# Patient Record
Sex: MALE | Race: WHITE | Employment: FULL TIME | URBAN - METROPOLITAN AREA
[De-identification: names, ages, dates, MRNs, and addresses within clinical notes are randomized per-mention and may not be internally consistent; named-entity substitution may affect disease eponyms.]

---

## 2017-09-12 ENCOUNTER — OFFICE VISIT (OUTPATIENT)
Dept: FAMILY MEDICINE | Facility: CLINIC | Age: 52
End: 2017-09-12
Payer: COMMERCIAL

## 2017-09-12 VITALS
BODY MASS INDEX: 18.2 KG/M2 | HEART RATE: 81 BPM | TEMPERATURE: 97.5 F | WEIGHT: 130 LBS | DIASTOLIC BLOOD PRESSURE: 97 MMHG | SYSTOLIC BLOOD PRESSURE: 147 MMHG | HEIGHT: 71 IN | OXYGEN SATURATION: 98 %

## 2017-09-12 DIAGNOSIS — I10 HYPERTENSION GOAL BP (BLOOD PRESSURE) < 140/90: ICD-10-CM

## 2017-09-12 DIAGNOSIS — J40 BRONCHITIS: Primary | ICD-10-CM

## 2017-09-12 DIAGNOSIS — F17.200 SMOKER: ICD-10-CM

## 2017-09-12 PROCEDURE — 99214 OFFICE O/P EST MOD 30 MIN: CPT | Performed by: FAMILY MEDICINE

## 2017-09-12 RX ORDER — AZITHROMYCIN 250 MG/1
TABLET, FILM COATED ORAL
Qty: 6 TABLET | Refills: 0 | Status: SHIPPED | OUTPATIENT
Start: 2017-09-12 | End: 2019-11-26

## 2017-09-12 RX ORDER — PREDNISONE 20 MG/1
40 TABLET ORAL DAILY
Qty: 10 TABLET | Refills: 0 | Status: SHIPPED | OUTPATIENT
Start: 2017-09-12 | End: 2017-09-17

## 2017-09-12 NOTE — MR AVS SNAPSHOT
After Visit Summary   9/12/2017    Paddy Colmenares    MRN: [id number]           Patient Information     Date Of Birth          1965        Visit Information        Provider Department      9/12/2017 9:00 AM Hakeem Pena MD [facility]        Today's Diagnoses     Bronchitis    -  1    Hypertension goal BP (blood pressure) < 140/90        Smoker          Care Instructions      Discharge Instructions for High Blood Pressure (Hypertension)  You have been diagnosed with high blood pressure (also called hypertension). This means the force of blood against your artery walls is too strong. It also means your heart is working hard to move blood. High blood pressure usually has no symptoms, but over time, it can damage your heart, blood vessels, eyes, kidneys, and other organs. With help from your doctor, you can manage your blood pressure and protect your health.  Taking medicine    Learn to take your own blood pressure. Keep a record of your results. Ask your doctor which readings mean that you need medical attention.    Take your blood pressure medicine exactly as directed. Don t skip doses. Missing doses can cause your blood pressure to get out of control.    If you do miss a dose (or doses) check with your healthcare provider about what to do.    Avoid medicine that contain heart stimulants, including over-the-counter drugs. Check for warnings about high blood pressure on the label. Ask the pharmacist before purchasing something you haven't used before    Check with your doctor or pharmacist before taking a decongestant. Some decongestants can worsen high blood pressure.  Lifestyle changes    Maintain a healthy weight. Get help to lose any extra pounds.    Cut back on salt.    Limit canned, dried, packaged, and fast foods.    Don t add salt to your food at the table.    Season foods with herbs instead of salt when you cook.    Request no added salt when you go to a  "restaurant.    The American Heart Association s (AHA) \"ideal\" sodium intake recommendation is 1,500 milligrams per day.  However, since American's eat so much salt, the AHA says a positive change can occur by cutting back to even 2,400 milligrams of sodium a day.     Follow the DASH (Dietary Approaches to Stop Hypertension) eating plan. This plan recommends vegetables, fruits, whole gains, and other heart healthy foods.    Begin an exercise program. Ask your doctor how to get started. The American Heart Association recommends aerobic exercise 3 to 4 times a week for an average of 40 minutes at a time, with your doctor's approval. Simple activities like walking or gardening can help.    Break the smoking habit. Enroll in a stop-smoking program to improve your chances of success. Ask your healthcare provider about programs and medicines to help you stop smoking.    Limit drinks that contain caffeine (coffee, black or green tea, cola) to 2 per day.    Never take stimulants such as amphetamines or cocaine; these drugs can be deadly for someone with high blood pressure.    Control your stress. Learn stress-management techniques.    Limit alcohol to no more than 1 drink a day for women and 2 drinks a day for men.  Follow-up care  Make a follow-up appointment as directed by our staff.     When to seek medical care  Call your doctor immediately or seek emergency care if you have any of the following:    Chest pain or shortness of breath (call 911)    Moderate to severe headache    Weakness in the muscles of your face, arms, or legs    Trouble speaking    Extreme drowsiness    Confusion    Fainting or dizziness    Pulsating or rushing sound in your ears    Unexplained nosebleed    Weakness, tingling, or numbness of your face, arms, or legs    Change in vision    Blood pressure measured at home that is greater than 180/110   Date Last Reviewed: 4/27/2016 2000-2017 [employer]. [address] [place] [address], [place], " [place] [address]. All rights reserved. This information is not intended as a substitute for professional medical care. Always follow your healthcare professional's instructions.        Your High Blood Pressure Risk Factors  Risk factors are things that make you more likely to have a disease or condition. Do you know your risk factors for high blood pressure? You can t do anything about some risk factors. But other risk factors are things that can be changed. Know what high blood pressure risk factors you have. Then find out what changes you can make to help control your risk for high blood pressure. Start with the change that you think will be easiest for you.  Risk factors you can t control  Though you can t change any of the things listed below, check off the ones that apply to you. The more boxes you check, the greater your risk for high blood pressure.  Family history  ? One or both of your parents or grandparents has had high blood pressure or heart disease.  Gender and age  ? You re a man over age 55 or a postmenopausal woman.  Risk factors you can control  There are plenty of risk factors for high blood pressure that you can control. Learn what these risk factors are and then find out how to reduce your risk. Check the ones that apply to you.  ? What you eat  Do you eat a lot of salty, fatty, fried, or greasy foods? Do you go to restaurants or eat out frequently?  ? Alcohol consumption  Do you drink more than 1 drink a day if you are a female or more than 2 drinks a day if you male?   ? If you smoke or someone close to you smokes  Do you smoke cigarettes or cigars, chew tobacco, or dip snuff? Are you exposed to second-hand smoke on a regular basis?  ? How active you are   Are you inactive most of the time at work and at home? Do you go weeks without exercising?  ? Your weight   Has your doctor said that you are 15 or more pounds overweight?  ? Your stress level    Do you often feel anxious, nervous, and stressed? Do  you feel that you don't have a supportive environment?  Date Last Reviewed: 4/27/2016 2000-2017 [employer]. [address] [place] [address], [place] [address]. All rights reserved. This information is not intended as a substitute for professional medical care. Always follow your healthcare professional's instructions.        Established High Blood Pressure    High blood pressure (hypertension) is a chronic disease. Often, healthcare providers don t know what causes it. But it can be caused by certain health conditions and medicines.  If you have high blood pressure, you may not have any symptoms. If you do have symptoms, they may include headache, dizziness, changes in your vision, chest pain, and shortness of breath. But even without symptoms, high blood pressure that s not treated raises your risk for heart attack and stroke. High blood pressure is a serious health risk and shouldn t be ignored.  A blood pressure reading is made up of two numbers: a higher number over a lower number. The top number is the systolic pressure. The bottom number is the diastolic pressure. A normal blood pressure is a systolic pressure of  less than 120 over a diastolic pressure of less than 80. You will see your blood pressure readings written together. For example, a person with a systolic pressure of 188 and a diastolic pressure of 78 will have 118/78 written in the medical record.  High blood pressure is when either the top number is 140 or higher, or the bottom number is 90 or higher. This must be the result when taking your blood pressure a number of times. The blood pressures between normal and high are called prehypertension.  Home care  If you have high blood pressure, you should do what is listed below to lower your blood pressure. If you are taking medicines for high blood pressure, these methods may reduce or end your need for medicines in the future.    Begin a weight-loss program if you are overweight.    Cut  back on how much salt you get in your diet. Here s how to do this:    Don t eat foods that have a lot of salt. These include olives, pickles, smoked meats, and salted potato chips.    Don t add salt to your food at the table.    Use only small amounts of salt when cooking.    Start an exercise program. Talk with your healthcare provider about the type of exercise program that would be best for you. It doesn't have to be hard. Even brisk walking for 20 minutes 3 times a week is a good form of exercise.    Don t take medicines that stimulate the heart. This includes many over-the-counter cold and sinus decongestant pills and sprays, as well as diet pills. Check the warnings about hypertension on the label. Before buying any over-the-counter medicines or supplements, always ask the pharmacist about the product's potential interaction with your high blood pressure and your high blood pressure medicines.    Stimulants such as amphetamine or cocaine could be deadly for someone with high blood pressure. Never take these.    Limit how much caffeine you get in your diet. Switch to caffeine-free products.    Stop smoking. If you are a long-time smoker, this can be hard. Talk to your healthcare provider about medicines and nicotine replacement options to help you. Also, enroll in a stop-smoking program to make it more likely that you will quit for good.    Learn how to handle stress. This is an important part of any program to lower blood pressure. Learn about relaxation methods like meditation, yoga, or biofeedback.    If your provider prescribed medicines, take them exactly as directed. Missing doses may cause your blood pressure get out of control.    If you miss a dose or doses, check with your healthcare provider or pharmacist about what to do.    Consider buying an automatic blood pressure machine. Ask your provider for a recommendation. You can get one of these at most pharmacies.     The American Heart Association  recommends the following guidelines for home blood pressure monitoring:    Don't smoke or drink coffee for 30 minutes before taking your blood pressure.    Go to the bathroom before the test.    Relax for 5 minutes before taking the measurement.    Sit with your back supported (don't sit on a couch or soft chair); keep your feet on the floor uncrossed. Place your arm on a solid flat surface (like a table) with the upper part of the arm at heart level. Place the middle of the cuff directly above the eye of the elbow. Check the monitor's instruction manual for an illustration.    Take multiple readings. When you measure, take 2 to 3 readings one minute apart and record all of the results.    Take your blood pressure at the same time every day, or as your healthcare provider recommends.    Record the date, time, and blood pressure reading.    Take the record with you to your next medical appointment. If your blood pressure monitor has a built-in memory, simply take the monitor with you to your next appointment.    Call your provider if you have several high readings. Don't be frightened by a single high blood pressure reading, but if you get several high readings, check in with your healthcare provider.    Note: When blood pressure reaches a systolic (top number) of 180 or higher OR diastolic (bottom number) of 110 or higher, seek emergency medical treatment.  Follow-up care  You will need to see your healthcare provider regularly. This is to check your blood pressure and to make changes to your medicines. Make a follow-up appointment as directed. Bring the record of your home blood pressure readings to the appointment.  When to seek medical advice  Call your healthcare provider right away if any of these occur:    Blood pressure reaches a systolic (upper number) of 180 or higher OR a diastolic (bottom number) of 110 or higher    Chest pain or shortness of breath    Severe headache    Throbbing or rushing sound in the  "ears    Nosebleed    Sudden severe pain in your belly (abdomen)    Extreme drowsiness, confusion, or fainting    Dizziness or spinning sensation (vertigo)    Weakness of an arm or leg or one side of the face    You have problems speaking or seeing   Date Last Reviewed: 12/1/2016 2000-2017 [employer]. [address] [place] [address]. All rights reserved. This information is not intended as a substitute for professional medical care. Always follow your healthcare professional's instructions.                Follow-ups after your visit        Who to contact     If you have questions or need follow up information about today's clinic visit or your schedule please contact [facility] directly at [phone].  Normal or non-critical lab and imaging results will be communicated to you by [employer], letter or phone within 4 business days after the clinic has received the results. If you do not hear from us within 7 days, please contact the clinic through [employer] or phone. If you have a critical or abnormal lab result, we will notify you by phone as soon as possible.  Submit refill requests through [employer] or call your pharmacy and they will forward the refill request to us. Please allow 3 business days for your refill to be completed.          Additional Information About Your Visit        [employer] Information     [employer] lets you send messages to your doctor, view your test results, renew your prescriptions, schedule appointments and more. To sign up, go to [email or web address] . Click on \"Log in\" on the left side of the screen, which will take you to the Welcome page. Then click on \"Sign up Now\" on the right side of the page.     You will be asked to enter the access code listed below, as well as some personal information. Please follow the directions to create your username and password.     Your access code is: [email or web address]  Expires: 12/11/2017 10:04 AM     Your access " "code will  in 90 days. If you need help or a new code, please call your [facility] clinic or [phone].        Care EveryWhere ID     This is your Care EveryWhere ID. This could be used by other organizations to access your [facility] medical records  [id number]        Your Vitals Were     Pulse Temperature Height Pulse Oximetry BMI (Body Mass Index)       81 97.5  F (36.4  C) (Oral) 5' 11.25\" (1.81 m) 98% 18 kg/m2        Blood Pressure from Last 3 Encounters:   17 (!) 147/97   16 (!) 160/100   12/14/10 139/88    Weight from Last 3 Encounters:   17 130 lb (59 kg)   16 139 lb (63 kg)   12/14/10 133 lb (60.3 kg)              Today, you had the following     No orders found for display         Today's Medication Changes          These changes are accurate as of: 17 10:04 AM.  If you have any questions, ask your nurse or doctor.               Start taking these medicines.        Dose/Directions    predniSONE 20 MG tablet   Commonly known as:  DELTASONE   Used for:  Bronchitis   Started by:  Hakeem Pena MD        Dose:  40 mg   Take 2 tablets (40 mg) by mouth daily for 5 days   Quantity:  10 tablet   Refills:  0         Stop taking these medicines if you haven't already. Please contact your care team if you have questions.     doxycycline 100 MG tablet   Commonly known as:  VIBRA-TABS   Stopped by:  Hakeem Pena MD           EXCEDRIN PO   Stopped by:  Hakeem Pena MD           guaiFENesin-codeine 100-10 MG/5ML Soln solution   Commonly known as:  ROBITUSSIN AC   Stopped by:  Hakeem Pena MD                Where to get your medicines      These medications were sent to [facility] [address] [place] [address] [address] [facility]  [address] [facility] [address]     Phone:  [phone]     azithromycin 250 MG tablet    predniSONE 20 MG tablet                Primary Care Provider Office Phone # Fax #    Hakeem Pena MD " [phone]       [address]  [facility] [address]        Equal Access to Services     MAXIME GILLETTE : Hadii aad ku hadcharbeljuan Alonso, wabrandida mariannasamaria, pramodta kacarterda maylin, librado crespo haleyobed pendleton laKarinahector borja. So [facility] [phone].    ATENCIÓN: Si habla español, tiene a miranda disposición servicios gratuitos de asistencia lingüística. Llame al [phone].    We comply with applicable federal civil rights laws and [place] laws. We do not discriminate on the basis of race, color, national origin, age, disability sex, sexual orientation or gender identity.            Thank you!     Thank you for choosing [facility]  for your care. Our goal is always to provide you with excellent care. Hearing back from our patients is one way we can continue to improve our services. Please take a few minutes to complete the written survey that you may receive in the mail after your visit with us. Thank you!             Your Updated Medication List - Protect others around you: Learn how to safely use, store and throw away your medicines at www.disposemymeds.org.          This list is accurate as of: 9/12/17 10:04 AM.  Always use your most recent med list.                   Brand Name Dispense Instructions for use Diagnosis    azithromycin 250 MG tablet    ZITHROMAX    6 tablet    Two tablets first day, then one tablet daily for four days.    Bronchitis       predniSONE 20 MG tablet    DELTASONE    10 tablet    Take 2 tablets (40 mg) by mouth daily for 5 days    Bronchitis

## 2017-09-12 NOTE — PROGRESS NOTES
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 No pertinent past medical history.    History reviewed. No pertinent surgical history.    History reviewed. No pertinent family history.    Social History   Substance Use Topics     Smoking status: Current Every Day Smoker     Packs/day: 0.50     Types: Cigarettes     Smokeless tobacco: Never Used     Alcohol use No         Problem list and histories reviewed & adjusted, as indicated.    Patient has cut down on his smoking in the last few days     He is having yellow mucus     O; BP (!) 147/97  Pulse 81  Temp 97.5  F (36.4  C) (Oral)  Ht 5' 11.25\" (1.81 m)  Wt 130 lb (59 kg)  SpO2 98%  BMI 18 kg/m2    Head: Normocephalic, atraumatic.  Eyes: Conjunctiva clear, non icteric. PERRLA.  Ears: External ears and TMs normal BL.  Nose: Septum midline, nasal mucosa pink and moist. No discharge.  Mouth / Throat: Normal dentition.  No oral lesions. Pharynx non erythematous, tonsils without hypertrophy.  Neck: Supple, no enlarged LN, trachea midline.    Mild sinus tenderness     Chest wall normal to inspection and palpation. Good excursion bilaterally. Lungs clear to auscultation. Good air movement bilaterally without rales, wheezes, rhonchi are heard throughout   Regular rate and  " rhythm. S1 and S2 normal, no murmurs, clicks, gallops or rubs. No edema or JVD.    Repeat bp is unchanged      ICD-10-CM    1. Bronchitis J40 azithromycin (ZITHROMAX) 250 MG tablet     predniSONE (DELTASONE) 20 MG tablet   2. Hypertension goal BP (blood pressure) < 140/90 I10    3. Smoker F17.200        Recheck bp in 1 month

## 2017-09-12 NOTE — PATIENT INSTRUCTIONS
"  Discharge Instructions for High Blood Pressure (Hypertension)  You have been diagnosed with high blood pressure (also called hypertension). This means the force of blood against your artery walls is too strong. It also means your heart is working hard to move blood. High blood pressure usually has no symptoms, but over time, it can damage your heart, blood vessels, eyes, kidneys, and other organs. With help from your doctor, you can manage your blood pressure and protect your health.  Taking medicine    Learn to take your own blood pressure. Keep a record of your results. Ask your doctor which readings mean that you need medical attention.    Take your blood pressure medicine exactly as directed. Don t skip doses. Missing doses can cause your blood pressure to get out of control.    If you do miss a dose (or doses) check with your healthcare provider about what to do.    Avoid medicine that contain heart stimulants, including over-the-counter drugs. Check for warnings about high blood pressure on the label. Ask the pharmacist before purchasing something you haven't used before    Check with your doctor or pharmacist before taking a decongestant. Some decongestants can worsen high blood pressure.  Lifestyle changes    Maintain a healthy weight. Get help to lose any extra pounds.    Cut back on salt.    Limit canned, dried, packaged, and fast foods.    Don t add salt to your food at the table.    Season foods with herbs instead of salt when you cook.    Request no added salt when you go to a restaurant.    The American Heart Association s (AHA) \"ideal\" sodium intake recommendation is 1,500 milligrams per day.  However, since American's eat so much salt, the AHA says a positive change can occur by cutting back to even 2,400 milligrams of sodium a day.     Follow the DASH (Dietary Approaches to Stop Hypertension) eating plan. This plan recommends vegetables, fruits, whole gains, and other heart healthy foods.    Begin " an exercise program. Ask your doctor how to get started. The American Heart Association recommends aerobic exercise 3 to 4 times a week for an average of 40 minutes at a time, with your doctor's approval. Simple activities like walking or gardening can help.    Break the smoking habit. Enroll in a stop-smoking program to improve your chances of success. Ask your healthcare provider about programs and medicines to help you stop smoking.    Limit drinks that contain caffeine (coffee, black or green tea, cola) to 2 per day.    Never take stimulants such as amphetamines or cocaine; these drugs can be deadly for someone with high blood pressure.    Control your stress. Learn stress-management techniques.    Limit alcohol to no more than 1 drink a day for women and 2 drinks a day for men.  Follow-up care  Make a follow-up appointment as directed by our staff.     When to seek medical care  Call your doctor immediately or seek emergency care if you have any of the following:    Chest pain or shortness of breath (call 911)    Moderate to severe headache    Weakness in the muscles of your face, arms, or legs    Trouble speaking    Extreme drowsiness    Confusion    Fainting or dizziness    Pulsating or rushing sound in your ears    Unexplained nosebleed    Weakness, tingling, or numbness of your face, arms, or legs    Change in vision    Blood pressure measured at home that is greater than 180/110   Date Last Reviewed: 4/27/2016 2000-2017 [employer]. [address] [place] [address]. All rights reserved. This information is not intended as a substitute for professional medical care. Always follow your healthcare professional's instructions.        Your High Blood Pressure Risk Factors  Risk factors are things that make you more likely to have a disease or condition. Do you know your risk factors for high blood pressure? You can t do anything about some risk factors. But other risk factors are  things that can be changed. Know what high blood pressure risk factors you have. Then find out what changes you can make to help control your risk for high blood pressure. Start with the change that you think will be easiest for you.  Risk factors you can t control  Though you can t change any of the things listed below, check off the ones that apply to you. The more boxes you check, the greater your risk for high blood pressure.  Family history  ? One or both of your parents or grandparents has had high blood pressure or heart disease.  Gender and age  ? You re a man over age 55 or a postmenopausal woman.  Risk factors you can control  There are plenty of risk factors for high blood pressure that you can control. Learn what these risk factors are and then find out how to reduce your risk. Check the ones that apply to you.  ? What you eat  Do you eat a lot of salty, fatty, fried, or greasy foods? Do you go to restaurants or eat out frequently?  ? Alcohol consumption  Do you drink more than 1 drink a day if you are a female or more than 2 drinks a day if you male?   ? If you smoke or someone close to you smokes  Do you smoke cigarettes or cigars, chew tobacco, or dip snuff? Are you exposed to second-hand smoke on a regular basis?  ? How active you are   Are you inactive most of the time at work and at home? Do you go weeks without exercising?  ? Your weight   Has your doctor said that you are 15 or more pounds overweight?  ? Your stress level    Do you often feel anxious, nervous, and stressed? Do you feel that you don't have a supportive environment?  Date Last Reviewed: 4/27/2016 2000-2017 [employer]. [address] [place] [address], [place] [address]. All rights reserved. This information is not intended as a substitute for professional medical care. Always follow your healthcare professional's instructions.        Established High Blood Pressure    High blood pressure (hypertension) is a chronic disease.  Often, healthcare providers don t know what causes it. But it can be caused by certain health conditions and medicines.  If you have high blood pressure, you may not have any symptoms. If you do have symptoms, they may include headache, dizziness, changes in your vision, chest pain, and shortness of breath. But even without symptoms, high blood pressure that s not treated raises your risk for heart attack and stroke. High blood pressure is a serious health risk and shouldn t be ignored.  A blood pressure reading is made up of two numbers: a higher number over a lower number. The top number is the systolic pressure. The bottom number is the diastolic pressure. A normal blood pressure is a systolic pressure of  less than 120 over a diastolic pressure of less than 80. You will see your blood pressure readings written together. For example, a person with a systolic pressure of 188 and a diastolic pressure of 78 will have 118/78 written in the medical record.  High blood pressure is when either the top number is 140 or higher, or the bottom number is 90 or higher. This must be the result when taking your blood pressure a number of times. The blood pressures between normal and high are called prehypertension.  Home care  If you have high blood pressure, you should do what is listed below to lower your blood pressure. If you are taking medicines for high blood pressure, these methods may reduce or end your need for medicines in the future.    Begin a weight-loss program if you are overweight.    Cut back on how much salt you get in your diet. Here s how to do this:    Don t eat foods that have a lot of salt. These include olives, pickles, smoked meats, and salted potato chips.    Don t add salt to your food at the table.    Use only small amounts of salt when cooking.    Start an exercise program. Talk with your healthcare provider about the type of exercise program that would be best for you. It doesn't have to be hard.  Even brisk walking for 20 minutes 3 times a week is a good form of exercise.    Don t take medicines that stimulate the heart. This includes many over-the-counter cold and sinus decongestant pills and sprays, as well as diet pills. Check the warnings about hypertension on the label. Before buying any over-the-counter medicines or supplements, always ask the pharmacist about the product's potential interaction with your high blood pressure and your high blood pressure medicines.    Stimulants such as amphetamine or cocaine could be deadly for someone with high blood pressure. Never take these.    Limit how much caffeine you get in your diet. Switch to caffeine-free products.    Stop smoking. If you are a long-time smoker, this can be hard. Talk to your healthcare provider about medicines and nicotine replacement options to help you. Also, enroll in a stop-smoking program to make it more likely that you will quit for good.    Learn how to handle stress. This is an important part of any program to lower blood pressure. Learn about relaxation methods like meditation, yoga, or biofeedback.    If your provider prescribed medicines, take them exactly as directed. Missing doses may cause your blood pressure get out of control.    If you miss a dose or doses, check with your healthcare provider or pharmacist about what to do.    Consider buying an automatic blood pressure machine. Ask your provider for a recommendation. You can get one of these at most pharmacies.     The American Heart Association recommends the following guidelines for home blood pressure monitoring:    Don't smoke or drink coffee for 30 minutes before taking your blood pressure.    Go to the bathroom before the test.    Relax for 5 minutes before taking the measurement.    Sit with your back supported (don't sit on a couch or soft chair); keep your feet on the floor uncrossed. Place your arm on a solid flat surface (like a table) with the upper part of  the arm at heart level. Place the middle of the cuff directly above the eye of the elbow. Check the monitor's instruction manual for an illustration.    Take multiple readings. When you measure, take 2 to 3 readings one minute apart and record all of the results.    Take your blood pressure at the same time every day, or as your healthcare provider recommends.    Record the date, time, and blood pressure reading.    Take the record with you to your next medical appointment. If your blood pressure monitor has a built-in memory, simply take the monitor with you to your next appointment.    Call your provider if you have several high readings. Don't be frightened by a single high blood pressure reading, but if you get several high readings, check in with your healthcare provider.    Note: When blood pressure reaches a systolic (top number) of 180 or higher OR diastolic (bottom number) of 110 or higher, seek emergency medical treatment.  Follow-up care  You will need to see your healthcare provider regularly. This is to check your blood pressure and to make changes to your medicines. Make a follow-up appointment as directed. Bring the record of your home blood pressure readings to the appointment.  When to seek medical advice  Call your healthcare provider right away if any of these occur:    Blood pressure reaches a systolic (upper number) of 180 or higher OR a diastolic (bottom number) of 110 or higher    Chest pain or shortness of breath    Severe headache    Throbbing or rushing sound in the ears    Nosebleed    Sudden severe pain in your belly (abdomen)    Extreme drowsiness, confusion, or fainting    Dizziness or spinning sensation (vertigo)    Weakness of an arm or leg or one side of the face    You have problems speaking or seeing   Date Last Reviewed: 12/1/2016 2000-2017 [employer]. [address] [place] [address]. All rights reserved. This information is not intended as a substitute  for professional medical care. Always follow your healthcare professional's instructions.

## 2017-09-12 NOTE — NURSING NOTE
"Chief Complaint   Patient presents with     URI       Initial BP (!) 147/97  Pulse 81  Temp 97.5  F (36.4  C) (Oral)  Ht 5' 11.25\" (1.81 m)  Wt 130 lb (59 kg)  SpO2 98%  BMI 18 kg/m2 Estimated body mass index is 18 kg/(m^2) as calculated from the following:    Height as of this encounter: 5' 11.25\" (1.81 m).    Weight as of this encounter: 130 lb (59 kg).  Medication Reconciliation: complete   Mikayla Ann MA      "

## 2019-05-30 ENCOUNTER — TELEPHONE (OUTPATIENT)
Dept: FAMILY MEDICINE | Facility: CLINIC | Age: 54
End: 2019-05-30

## 2019-05-30 NOTE — TELEPHONE ENCOUNTER
Panel Management Review      Patient has the following on his problem list:     Hypertension   Last three blood pressure readings:  BP Readings from Last 3 Encounters:   09/12/17 (!) 147/97   05/19/16 (!) 160/100   12/14/10 139/88     Blood pressure: FAILED    HTN Guidelines:  Less than 140/90      Composite cancer screening  Chart review shows that this patient is due/due soon for the following Colonoscopy  Summary:    Patient is due/failing the following:   BP CHECK, COLONOSCOPY and PHYSICAL    Action needed:   Patient needs office visit for preventive care visit, BP check, due for colonoscopy.    Type of outreach:    Sent letter.    Questions for provider review:    None                                                                                                                                    Chari Pimentel

## 2019-11-25 ENCOUNTER — TELEPHONE (OUTPATIENT)
Dept: FAMILY MEDICINE | Facility: CLINIC | Age: 54
End: 2019-11-25

## 2019-11-25 NOTE — TELEPHONE ENCOUNTER
Called patient and he wanted to know if he should be icing and elevating his arm. Nurse informed him that icing/heating pad would not hurt and that he could over the counter pain reliving medications and to be sure to follow the instructions on the packaging. Nurse advised if the pain gets too bad he could seek care in the UC or ER. He verbalized understanding and agreeable to plan.     Nataliya Sharma RN

## 2019-11-25 NOTE — TELEPHONE ENCOUNTER
Reason for Call:  Injury     Detailed comments: Patient injured arm at work on the 23rd and would like to speak to someone before his apt tomorrow    Phone Number Patient can be reached at: Home number on file [phone] (home)    Best Time: Anytime    Can we leave a detailed message on this number? YES    Call taken on 11/25/2019 at 8:38 AM by Randi Freed

## 2019-11-26 ENCOUNTER — ANCILLARY PROCEDURE (OUTPATIENT)
Dept: GENERAL RADIOLOGY | Facility: CLINIC | Age: 54
End: 2019-11-26
Attending: FAMILY MEDICINE
Payer: OTHER MISCELLANEOUS

## 2019-11-26 ENCOUNTER — OFFICE VISIT (OUTPATIENT)
Dept: FAMILY MEDICINE | Facility: CLINIC | Age: 54
End: 2019-11-26
Payer: OTHER MISCELLANEOUS

## 2019-11-26 VITALS
SYSTOLIC BLOOD PRESSURE: 150 MMHG | DIASTOLIC BLOOD PRESSURE: 90 MMHG | HEART RATE: 88 BPM | WEIGHT: 139 LBS | OXYGEN SATURATION: 98 % | BODY MASS INDEX: 19.46 KG/M2 | HEIGHT: 71 IN | TEMPERATURE: 97.8 F

## 2019-11-26 DIAGNOSIS — Z02.6 ENCOUNTER RELATED TO WORKER'S COMPENSATION CLAIM: ICD-10-CM

## 2019-11-26 DIAGNOSIS — M25.532 PAIN IN JOINT INVOLVING FOREARM, LEFT: ICD-10-CM

## 2019-11-26 DIAGNOSIS — M25.532 LEFT WRIST PAIN: ICD-10-CM
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
## 2019-11-26 DIAGNOSIS — Z02.6 ENCOUNTER RELATED TO WORKER'S COMPENSATION CLAIM: Primary | ICD-10-CM

## 2019-11-26 PROCEDURE — 73090 X-RAY EXAM OF FOREARM: CPT | Mod: LT

## 2019-11-26 PROCEDURE — 99213 OFFICE O/P EST LOW 20 MIN: CPT | Performed by: FAMILY MEDICINE

## 2019-11-26 PROCEDURE — 73110 X-RAY EXAM OF WRIST: CPT | Mod: LT

## 2019-11-26 ASSESSMENT — PAIN SCALES - GENERAL: PAINLEVEL: SEVERE PAIN (6)

## 2019-11-26 ASSESSMENT — MIFFLIN-ST. JEOR: SCORE: 1502.63

## 2019-11-26 NOTE — PATIENT INSTRUCTIONS
Patient Education     Muscle Strain in the Extremities  A muscle strain is a stretching and tearing of muscle fibers. This causes pain, especially when you move that muscle. There may also be some swelling and bruising.  Home care    Keep the hurt area raised above heart level to reduce pain and swelling. This is especially important during the first 48 hours.    Apply an ice pack over the injured area for 15 to 20 minutes every 3 to 6 hours. You should do this for the first 24 to 48 hours. You can make an ice pack by filling a plastic bag that seals at the top with ice cubes and then wrapping it with a thin towel. Be careful not to injure your skin with the ice treatments. Ice should never be applied directly to skin. Continue the use of ice packs for relief of pain and swelling as needed. After 48 hours, apply heat (warm shower or warm bath) for 15 to 20 minutes several times a day, or alternate ice and heat.    You may use over-the-counter pain medicine to control pain, unless another medicine was prescribed. If you have chronic liver or kidney disease or ever had a stomach ulcer or gastrointestinal bleeding, talk with your healthcare provider before using these medicines.    For leg strains: If crutches have been recommended, don t put full weight on the hurt leg until you can do so without pain. You can return to sports when you are able to hop and run on the injured leg without pain.  Follow-up care  Follow up with your healthcare provider, or as advised.  When to seek medical advice  Call your healthcare provider right away if any of these occur:    The toes of the injured leg become swollen, cold, blue, numb, or tingly    Pain or swelling increases  Date Last Reviewed: 5/1/2018 2000-2018 [employer]. [address] [place] [id number]. All rights reserved. This information is not intended as a substitute for professional medical care. Always follow your healthcare professional's  instructions.

## 2019-11-26 NOTE — LETTER
November 26, 2019      Paddy Colmenares  [address] [facility] [address]        To Whom It May Concern:    Paddy Colmenares  was seen on 11/26/2019.    Return to work on 11/26/2019, with the following restriction  Use only right hand,  Keep left forearm and wrist wrapped with ACE bandage.  May use arm sling for comfort as needed   for the next 2 weeks      Sincerely,        Sabrina Vuong MD

## 2019-11-26 NOTE — PROGRESS NOTES
Subjective     Paddy Colmenares is a 54 year old male who presents to clinic today for the following health issues:  Work comp  HPI :  Patient reports he works for [employer], he fell in the back of truck a trailer on his left side.  He fell November 23, 2019.  Since that time is been having pain, stiffness in his left wrist, left forearm.  He had no other injury, denies head injury, did not have any loss of consciousness.  Did not have shoulder injury, or back injury.  Has no injury to his elbow.  Has pain, and stiffness in his left forearm, left wrist, he also reports a few times he feels numbness in his index finger and his thumb.  He denies any previous injury or pain with his hand, wrist or forearm or elbow.    He reports he has been working using his right hand only, he reports the pain is been getting better.    Joint Pain    Onset: Saturday    Description:   Location: left wrist and left arm  Character: Sharp and Stabbing    Intensity: severe, 6/10    Progression of Symptoms: same, intermittent    Accompanying Signs & Symptoms:  Other symptoms: radiation of pain to elbow, numbness, tingling and swelling    History:   Previous similar pain: no       Precipitating factors:   Trauma or overuse: YES- tripped and fell    Alleviating factors:  Improved by: ice and elevation of hand    Therapies Tried and outcome: Ibuprofen; helped with pain    Patient Active Problem List   Diagnosis     Smoker     Hypertension goal BP (blood pressure) < 140/90     No past surgical history on file.    Social History     Tobacco Use     Smoking status: Current Every Day Smoker     Packs/day: 0.50     Types: Cigarettes     Smokeless tobacco: Never Used   Substance Use Topics     Alcohol use: No     No family history on file.      No current outpatient medications on file.     Allergies   Allergen Reactions     Penicillins        Reviewed and updated as needed this visit by Provider         Review of Systems   ROS COMP: Constitutional,  "HEENT, cardiovascular, pulmonary, gi and gu systems are negative, except as otherwise noted.      Objective    BP (!) 171/111 (BP Location: Right arm, Patient Position: Chair, Cuff Size: Adult Regular)   Pulse 88   Temp 97.8  F (36.6  C) (Oral)   Ht 1.803 m (5' 11\")   Wt 63 kg (139 lb)   SpO2 98%   BMI 19.39 kg/m    Body mass index is 19.39 kg/m .  Physical Exam   GENERAL APPEARANCE: healthy, alert and no distress  ORTHO:   SHOULDER Exam-Left   Inspection: no swelling, no bruising, no discoloration, no obvious deformity, no asymmetry, no glenohumeral joint anterior bulge, no distal clavicle elevation, no muscle atrophy, no scapular winging   Tenderness of: SC joint- no, clavicle(prox-mid)- no, clavicle-(mid-distal)- no, AC joint- no, acromion- no, anterior capsule- no, prox bicep tendon- no, greater tuberosity- no, prox humerus- no, supraspinatous- no, infraspinatous- no, superior trapezious- no, rhomboids- no   Range of Motion: Active- forward flexion- normal, abduction- normal, external rotation- normal, internal rotation- normal  Range of Motion: Passive- forward flexion- normal, abduction- normal, external rotation- normal, internal rotation- normal   Strength: forward flexion- 5/5, abduction- 5/5, internal rotation- 5/5, external rotation- 5/5 and bicep- full           Elbow Exam: Left side   Inspection: no swelling, no ecchymosis, no olecranon bursa swelling, no distal bicep tendon defect  Tender: None  Non-tender: all normal  Range of Motion: all normal  Strength: elbow strength full    Mild tenderness at left distal ulna.    Wrist Exam: WRIST: Left  Inspection: no swelling  no effusion  Palpation: Tender: distal radius  Non-tender: rest normal  Range of Motion: normal, mild tenderness  Strength: no deficits  Special tests: negative Tinel's at carpal tunnel.  , negative Phalen's.        Hand/Finger  left:  Exam: Inspection:All Normal  Tender: All Normal  Non-tender: All Normal  Range of Motion All " Normal  Strength: full strength          SKIN: no suspicious lesions or rashes  NEURO: Normal strength and tone, mentation intact and DTR symmetrically normal in lower extremities  PSYCH: mentation appears normal and affect normal/bright    Diagnostic Test Results:  Labs reviewed in Epic  XR's : negative for acute finding.        Assessment & Plan     1. Encounter related to worker's compensation claim  Reviewed x-ray results with him, both were normal, patient was advised with supportive care.    I did apply an AC bandage to his forearm, he was advised to use his arm sling for comfort as needed.    He is to return to work using his right hand, arm.    Follow-up in 2 weeks or sooner  - XR Forearm Left 2 Views; Future  - XR Wrist Left G/E 3 Views; Future    2. Fall, initial encounter      3. Pain in joint involving forearm, left    - XR Forearm Left 2 Views; Future  - XR Wrist Left G/E 3 Views; Future    4. Left wrist pain    - XR Forearm Left 2 Views; Future  - XR Wrist Left G/E 3 Views; Future     Patient Instructions     Patient Education     Muscle Strain in the Extremities  A muscle strain is a stretching and tearing of muscle fibers. This causes pain, especially when you move that muscle. There may also be some swelling and bruising.  Home care    Keep the hurt area raised above heart level to reduce pain and swelling. This is especially important during the first 48 hours.    Apply an ice pack over the injured area for 15 to 20 minutes every 3 to 6 hours. You should do this for the first 24 to 48 hours. You can make an ice pack by filling a plastic bag that seals at the top with ice cubes and then wrapping it with a thin towel. Be careful not to injure your skin with the ice treatments. Ice should never be applied directly to skin. Continue the use of ice packs for relief of pain and swelling as needed. After 48 hours, apply heat (warm shower or warm bath) for 15 to 20 minutes several times a day, or alternate  ice and heat.    You may use over-the-counter pain medicine to control pain, unless another medicine was prescribed. If you have chronic liver or kidney disease or ever had a stomach ulcer or gastrointestinal bleeding, talk with your healthcare provider before using these medicines.    For leg strains: If crutches have been recommended, don t put full weight on the hurt leg until you can do so without pain. You can return to sports when you are able to hop and run on the injured leg without pain.  Follow-up care  Follow up with your healthcare provider, or as advised.  When to seek medical advice  Call your healthcare provider right away if any of these occur:    The toes of the injured leg become swollen, cold, blue, numb, or tingly    Pain or swelling increases  Date Last Reviewed: 5/1/2018 2000-2018 [employer]. [address] [place] [address], [place] [address]. All rights reserved. This information is not intended as a substitute for professional medical care. Always follow your healthcare professional's instructions.               Return in about 2 weeks (around 12/10/2019).    Sabrina Vuong MD  [facility]

## 2019-12-27 ENCOUNTER — OFFICE VISIT (OUTPATIENT)
Dept: FAMILY MEDICINE | Facility: CLINIC | Age: 54
End: 2019-12-27
Payer: OTHER MISCELLANEOUS

## 2019-12-27 VITALS
HEART RATE: 93 BPM | DIASTOLIC BLOOD PRESSURE: 101 MMHG | BODY MASS INDEX: 19.5 KG/M2 | TEMPERATURE: 98.5 F | WEIGHT: 139.8 LBS | SYSTOLIC BLOOD PRESSURE: 154 MMHG

## 2019-12-27 DIAGNOSIS — S63.512D: ICD-10-CM

## 2019-12-27 DIAGNOSIS — S53.402D SPRAIN OF LEFT ELBOW, SUBSEQUENT ENCOUNTER: Primary | ICD-10-CM

## 2019-12-27 PROCEDURE — 99213 OFFICE O/P EST LOW 20 MIN: CPT | Performed by: FAMILY MEDICINE

## 2019-12-27 NOTE — PROGRESS NOTES
Subjective     Paddy Colmenares is a 54 year old male who presents to clinic today for the following health issues:    HPI   He was unloading a truck and he fell down on it wrong. 11/23/2019.   Seen 11/26/2019.  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 Sprain of left elbow, subsequent encounter S53.402D    2. Sprain of intercarpal joint of left wrist, subsequent encounter S63.512D      Patient is sent back to work   No limitations   I do not think patient has reached MMI, but he will probably reach this within 1 month of returning to work

## 2019-12-27 NOTE — LETTER
December 27, 2019      Paddy Colmenares  [address] [address]        To Whom It May Concern:    Paddy Colmenares was seen in our clinic. He may return to work without restrictions as of 12/28/2019.       Sincerely,        Hakeem Pena MD

## 2020-01-10 ENCOUNTER — TELEPHONE (OUTPATIENT)
Dept: FAMILY MEDICINE | Facility: CLINIC | Age: 55
End: 2020-01-10

## 2020-01-10 NOTE — TELEPHONE ENCOUNTER
Reason for Call:  Other     Detailed comments: Would like to know if patient has been released to go back to work..    Phone Number Patient can be reached at: Other phone number:   [facility]  [phone]         Best Time:     Can we leave a detailed message on this number? YES    Call taken on 1/10/2020 at 9:15 AM by Renae Palma

## 2020-01-10 NOTE — TELEPHONE ENCOUNTER
Called   [employer] Nadira Laona  [phone]     X 3.        The message stated please hold for an  and then it would hang up.    Sarah Paniagua RN,BSN  [facility]

## 2020-01-13 NOTE — TELEPHONE ENCOUNTER
Attempt # 2   Called   [employer] [facility] Ward  [phone]       Did they answer the phone: No, left a message on voicemail to return call to triage line at [phone].    Sarah Paniagua,RN,BSN  [facility]

## 2020-01-14 NOTE — TELEPHONE ENCOUNTER
I called number provided for work comp contact:  [phone].   Call put on hold for an , on hold a short time then disconnected.    Julissa Alexandra RN  [facility]

## 2020-01-14 NOTE — TELEPHONE ENCOUNTER
I called number provided for work comp contact:  [phone].   Call put on hold for an , on hold a short time then disconnected.     Julissa Alexandra RN  [facility]
